# Patient Record
Sex: FEMALE | Race: WHITE | NOT HISPANIC OR LATINO | Employment: OTHER | URBAN - METROPOLITAN AREA
[De-identification: names, ages, dates, MRNs, and addresses within clinical notes are randomized per-mention and may not be internally consistent; named-entity substitution may affect disease eponyms.]

---

## 2021-01-25 ENCOUNTER — TRANSFERRED RECORDS (OUTPATIENT)
Dept: HEALTH INFORMATION MANAGEMENT | Facility: CLINIC | Age: 86
End: 2021-01-25

## 2021-01-29 ENCOUNTER — TRANSCRIBE ORDERS (OUTPATIENT)
Dept: OTHER | Age: 86
End: 2021-01-29

## 2021-01-29 DIAGNOSIS — H53.2 DIPLOPIA: Primary | ICD-10-CM

## 2021-02-03 ENCOUNTER — OFFICE VISIT (OUTPATIENT)
Dept: OPHTHALMOLOGY | Facility: CLINIC | Age: 86
End: 2021-02-03
Attending: OPHTHALMOLOGY
Payer: COMMERCIAL

## 2021-02-03 DIAGNOSIS — H53.10 SUBJECTIVE VISUAL DISTURBANCE: ICD-10-CM

## 2021-02-03 DIAGNOSIS — H53.2 DOUBLE VISION: ICD-10-CM

## 2021-02-03 DIAGNOSIS — H49.13: Primary | ICD-10-CM

## 2021-02-03 DIAGNOSIS — H53.2 DIPLOPIA: ICD-10-CM

## 2021-02-03 DIAGNOSIS — H50.05 ESOTROPIA, ALTERNATING: ICD-10-CM

## 2021-02-03 PROCEDURE — 99204 OFFICE O/P NEW MOD 45 MIN: CPT | Performed by: OPHTHALMOLOGY

## 2021-02-03 PROCEDURE — G0463 HOSPITAL OUTPT CLINIC VISIT: HCPCS | Performed by: TECHNICIAN/TECHNOLOGIST

## 2021-02-03 ASSESSMENT — VISUAL ACUITY
OD_CC+: -3
OS_CC: 20/20
OD_CC: 20/20
OS_CC+: -3
METHOD: SNELLEN - LINEAR
CORRECTION_TYPE: GLASSES

## 2021-02-03 ASSESSMENT — CUP TO DISC RATIO
OS_RATIO: 0.3
OD_RATIO: 0.3

## 2021-02-03 ASSESSMENT — EXTERNAL EXAM - RIGHT EYE: OD_EXAM: NORMAL

## 2021-02-03 ASSESSMENT — TONOMETRY
IOP_METHOD: ICARE
OD_IOP_MMHG: 08
OS_IOP_MMHG: 08

## 2021-02-03 ASSESSMENT — SLIT LAMP EXAM - LIDS
COMMENTS: NORMAL
COMMENTS: NORMAL

## 2021-02-03 ASSESSMENT — CONF VISUAL FIELD
METHOD: COUNTING FINGERS
OD_NORMAL: 1
OS_NORMAL: 1

## 2021-02-03 ASSESSMENT — REFRACTION_WEARINGRX
OD_VPRISM: 2 BD
OD_SPHERE: -2.50
OS_ADD: +2.50
OS_CYLINDER: +1.50
OD_CYLINDER: +1.00
OS_AXIS: 080
OD_ADD: +2.50
OS_VPRISM: 2 BU
OD_AXIS: 110
OS_SPHERE: -2.50

## 2021-02-03 ASSESSMENT — EXTERNAL EXAM - LEFT EYE: OS_EXAM: NORMAL

## 2021-02-03 NOTE — PROGRESS NOTES
1. 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 Interestingly the patient provided a history of double vision for the past 4 years however letter from an evaluation by pediatric ophthalmologist Dr. Johanna almonte at the [facility] in 2011 indicates that the patient at that time had 6 years of proceeding double vision which would indicate that her issues with double vision had actually been occurring for around 16 years now. 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 She had difficulty with fusion despite prisms at that time due to excyclotorsion. She was offered strabismus surgery but considering she had no double vision with reading at that time it seems that she deferred surgery. 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 She also has a V-pattern horizontal strabismus in that she is exotropic in upgaze but esotropia in downgaze. She likewise has an approximate 10-11 prism diopter esotropia in primary gaze which is well corrected with her current prism prescription.    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 Her current prism prescription is very close to optimal. I do not think that changing her prisms would render her additional  benefit. Her primary barrier to fusion is her amount of extorsion. Interestingly this was designated as her primary barrier to fusion back in 2011 as well however an underlying left cranial nerve IV palsy was not proposed at that time.    I see 2 primary options here. One is for her to continue with her current prism glasses and adopting a chin down position to minimize extorsion and putting up with her residual double vision. The second option would be to undergo strabismus surgery with bilateral inferior oblique myectomy as well as bilateral medial rectus recessions. It is likely that she will require prism glasses after surgery but she should require less prism and the prism should be more effective in resolving her double vision with dramatic improvement of the extorsion. I would expect that each inferior oblique myectomy should correct 5 to 8  of extorsion which would either completely eliminate her extorsion or dramatically reduce it to the point that she should be able to fuse. Of course at her age and given her cardiac health general anesthesia does carry some risk despite that risk being quite low. She will consider her options of strabismus surgery and let us know later if she wishes to proceed. I informed her that there is no rush to make a decision regarding surgery and the surgery could be performed at any point in the future without making the surgery more challenging. Given her cardiac history I would like clearance from her cardiologist prior to surgery if she chooses to proceed.    Of note giving the pattern here and the duration of strabismus over at least 16 years I do not feel that additional work-up with neuroimaging, for instance, is indicated.     Yi Aguilera is a pleasant 89 year old White female who presents to neuro-ophthalmology clinic today as referral from Dr. Gibbs from [facility].     The patient reports 4 years of diplopia. 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 They discussed surgery at that time for her right CN IV palsy but elected not to proceed.    More recently, she was was evaluated by her primary eye doctor in [place]. She has been requiring increasing prism in her glasses to account for her worsening diplopia. She still notices a torsional component.  Within the past 5 months it has worsened more rapidly -- diplopia is more frequent and images are farther apart. Her glasses contain 4.25 GARRY prism in each eye, and 2BD OD and 2 BU OS.     While wearing her glasses: when given an image of a white dot on a screen, when she covers her right eye, she sees a bright white dot with a small hemicircle directly above it and overlapping but slightly lighter, and a third full dot above these that is the lightest. This is the same image she sees with both eyes uncovered. When she covers her left eye, she sees only one dot.      Without glasses, she notices a torsional component in the images with each eye. She prefers to hold her head chin down which helps reduce the torsion and diplopia.     She was able to lose 75 lbs intentionally over the last year.     POH: CEIOL (left 5/15/2002, right 6/12/2002). Early AMD OU (not on AREDS, former smoker - quit 50 yrs ago)  PMH: aortic stenosis s/p TAVR, s/p pacemaker (complicated postsurgical course - pericardial effusion), CKD III,   FH: negative for diplopia  Meds: Eliquis, ASA 81, colchicine, pantoprazole, rosuvastatin, Vitamin D, Zinc    The patient has normal afferent visual function in both eyes including normal best corrected visual acuity, color vision, confrontation visual fields, and pupillary light responses in both eyes.    Sensorimotor testing today showed a relatively concomitant esotropia of approximately 11 prism diopters that measured less at near. In primary gaze there was a right hypertropia of 4 prism diopters that increased  in left gaze and decreased in right gaze. On motility testing there was a noteworthy presence of bilateral inferior oblique overaction more prominent on the right but also present on the left. There was mild downbeat nystagmus in far lateral gaze which I believe is a red herring in this case and not entirely unusual in an 89-year-old. Motility was otherwise full in both eyes.  Structural eye examination revealed PCIOL is in good locations in both eyes. 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    We discussed in detail the risks, benefits, and alternatives of eye muscle correction surgery including the very rare risk of death or serious morbidity from a general anesthesia complication and the rare risk of severe vision loss in the operative eye(s) secondary to retinal detachment or endophthalmitis.  We discussed more likely sub-optimal outcomes including the unanticipated need for additional strabismus surgery.  Finally the patient was aware that prisms glasses may be required to optimize single vision following surgery.    After a thorough discussion of these risks, the patient decided to consider strabismus surgery.  The surgical plan would be as follows:    1. Bilateral inferior oblique myectomy   2. Bilateral medial rectus recessions    Follow-up 1 week after strabismus surgery.    Plan to operate at: [facility] (due to pacemaker and heart history)  Prism free glasses for adjustment: Non adjustable.         55 minutes were spent on the date of the encounter by me doing chart review, history and exam, documentation, and further activities as noted above    Complete documentation of historical and exam elements from today's encounter can be found in the full encounter summary report (not reduplicated in this progress note).  I personally obtained the chief complaint(s) and history of present illness.  I confirmed and edited as necessary the review of systems, past medical/surgical  history, family history, social history, and examination findings as documented by others; and I examined the patient myself.  I personally reviewed the relevant tests, images, and reports as documented above.  I formulated and edited as necessary the assessment and plan and discussed the findings and management plan with the patient and family.  I personally reviewed the ophthalmic test(s) associated with this encounter, agree with the interpretation(s) as documented by the resident/fellow, and have edited the corresponding report(s) as necessary.     MD Sarmad Bedoya MD  Ophthalmology PGY-3  [facility]

## 2021-02-03 NOTE — LETTER
2021    RE: Yi Aguilera  : 1931  MRN: [id number]    Dear Dr. Gibbs    Thank you for referring your patient, Yi Aguilera, to my neuro-ophthalmology clinic recently.  After a thorough neuro-ophthalmic history and examination, I came to the following conclusions:       1. 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 Interestingly the patient provided a history of double vision for the past 4 years however letter from an evaluation by pediatric ophthalmologist Dr. Johanna almonte at the [facility] in  indicates that the patient at that time had 6 years of proceeding double vision which would indicate that her issues with double vision had actually been occurring for around 16 years now. 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 She had difficulty with fusion despite prisms at that time due to excyclotorsion. She was offered strabismus surgery but considering she had no double vision with reading at that time it seems that she deferred surgery. 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 She also has a V-pattern horizontal strabismus in that she is exotropic in upgaze but esotropia in downgaze. She likewise has an approximate 10-11 prism diopter esotropia in primary gaze which is well corrected with her current prism prescription.    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 Her current prism prescription is very close to optimal. I do not think that changing her prisms would render her additional benefit. Her primary barrier to fusion is her amount of extorsion. Interestingly this was designated as her primary barrier to fusion back in 2011 as well however an underlying left cranial nerve IV palsy was not proposed at that time.    I see 2 primary options here. One is for her to continue with her current prism glasses and adopting a chin down position to minimize extorsion and putting up with her residual double vision. The second option would be to undergo strabismus surgery with bilateral inferior oblique myectomy as well as bilateral medial rectus recessions. It is likely that she will require prism glasses after surgery but she should require less prism and the prism should be more effective in resolving her double vision with dramatic improvement of the extorsion. I would expect that each inferior oblique myectomy should correct 5 to 8  of extorsion which would either completely eliminate her extorsion or dramatically reduce it to the point that she should be able to fuse. Of course at her age and given her cardiac health general anesthesia does carry some risk despite that risk being quite low. She will consider her options of strabismus surgery and let us know later if she wishes to proceed. I informed her that there is no rush to make a decision regarding surgery and the surgery could be performed at any point in the future without making the surgery more challenging. Given her cardiac history I would like clearance from her cardiologist prior to surgery if she chooses to proceed.    Of note giving the pattern here and the duration of strabismus over at least 16 years I do not feel that additional work-up with  neuroimaging, for instance, is indicated.     Yi Aguilera is a pleasant 89 year old White female who presents to neuro-ophthalmology clinic today as referral from Dr. Gibbs from [facility].     The patient reports 4 years of diplopia. On chart review, she was evaluated by Dr. Spencer in 2011 for V-pattern ET and RIGHT HYPERTROPIA with mildly symptomatic excyclotorsion. They discussed surgery at that time for her right CN IV palsy but elected not to proceed.    More recently, she was was evaluated by her primary eye doctor in [place]. She has been requiring increasing prism in her glasses to account for her worsening diplopia. She still notices a torsional component.  Within the past 5 months it has worsened more rapidly -- diplopia is more frequent and images are farther apart. Her glasses contain 4.25 GARRY prism in each eye, and 2BD OD and 2 BU OS.     While wearing her glasses: when given an image of a white dot on a screen, when she covers her right eye, she sees a bright white dot with a small hemicircle directly above it and overlapping but slightly lighter, and a third full dot above these that is the lightest. This is the same image she sees with both eyes uncovered. When she covers her left eye, she sees only one dot.      Without glasses, she notices a torsional component in the images with each eye. She prefers to hold her head chin down which helps reduce the torsion and diplopia.     She was able to lose 75 lbs intentionally over the last year.     POH: CEIOL (left 5/15/2002, right 6/12/2002). Early AMD OU (not on AREDS, former smoker - quit 50 yrs ago)  PMH: aortic stenosis s/p TAVR, s/p pacemaker (complicated postsurgical course - pericardial effusion), CKD III,   FH: negative for diplopia  Meds: Eliquis, ASA 81, colchicine, pantoprazole, rosuvastatin, Vitamin D, Zinc    The patient has normal afferent visual function in both eyes including normal best corrected visual acuity, color  vision, confrontation visual fields, and pupillary light responses in both eyes.    Sensorimotor testing today showed a relatively concomitant esotropia of approximately 11 prism diopters that measured less at near. In primary gaze there was a right hypertropia of 4 prism diopters that increased in left gaze and decreased in right gaze. On motility testing there was a noteworthy presence of bilateral inferior oblique overaction more prominent on the right but also present on the left. There was mild downbeat nystagmus in far lateral gaze which I believe is a red herring in this case and not entirely unusual in an 89-year-old. Motility was otherwise full in both eyes.  Structural eye examination revealed PCIOL is in good locations in both eyes. 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    We discussed in detail the risks, benefits, and alternatives of eye muscle correction surgery including the very rare risk of death or serious morbidity from a general anesthesia complication and the rare risk of severe vision loss in the operative eye(s) secondary to retinal detachment or endophthalmitis.  We discussed more likely sub-optimal outcomes including the unanticipated need for additional strabismus surgery.  Finally the patient was aware that prisms glasses may be required to optimize single vision following surgery.    After a thorough discussion of these risks, the patient decided to consider strabismus surgery.  The surgical plan would be as follows:    1. Bilateral inferior oblique myectomy   2. Bilateral medial rectus recessions    Follow-up 1 week after strabismus surgery.    Plan to operate at: [facility] (due to pacemaker and heart history)  Prism free glasses for adjustment: Non adjustable.      Again, thank you for trusting me with the care of your patient.  For further exam details, please feel free to contact our office for additional records.  If you wish to contact me  regarding this patient please email me at [email or web address] or give my clinic a call to arrange a phone conversation.    Sincerely,    Segundo Heaton MD  , Neuro-Ophthalmology and Adult Strabismus Surgery  [facility] Chair in Neuro-Ophthalmology  Department of Ophthalmology and Visual Neurosciences  [facility]

## 2023-05-23 ENCOUNTER — TELEPHONE (OUTPATIENT)
Dept: PHYSICAL MEDICINE AND REHAB | Facility: CLINIC | Age: 88
End: 2023-05-23
Payer: COMMERCIAL

## 2023-05-23 NOTE — TELEPHONE ENCOUNTER
[facility] Call Center    Phone Message    May a detailed message be left on voicemail: yes     Reason for Call: Other: Patient is requesting a call back to discuss whether an ablation will be appropriate for her as she has a pacemaker.     Action Taken: Message routed to:  Other: [facility]    Travel Screening: Not Applicable

## 2023-05-25 NOTE — TELEPHONE ENCOUNTER
Call placed to pt. Pt currently driving and on her way to an appt. She asked to be called back between 11:15 and 12:15.

## 2023-05-25 NOTE — TELEPHONE ENCOUNTER
Call placed to pt to discuss. Answered all of patients questions in regards to shoulder RFA process. She will continue with appt as scheduled and will call if she has any further questions/concerns prior.

## 2023-06-01 ENCOUNTER — TELEPHONE (OUTPATIENT)
Dept: PHYSICAL MEDICINE AND REHAB | Facility: CLINIC | Age: 88
End: 2023-06-01

## 2023-06-01 NOTE — TELEPHONE ENCOUNTER
[facility] Call Center    Phone Message    May a detailed message be left on voicemail: yes     Reason for Call: Other: Patient would like to know if her first appointment be a virtual, with Dr Villatoro, please call her back to advise     Action Taken: Message routed to:  Other: [facility]    Travel Screening: Not Applicable

## 2023-06-02 NOTE — TELEPHONE ENCOUNTER
We cannot do virtual visits for first appointment. Please call patient to let her know this. If this date/time does not work for her to come in please assist her with rescheduling. Thanks!

## 2023-06-16 ENCOUNTER — OFFICE VISIT (OUTPATIENT)
Dept: PHYSICAL MEDICINE AND REHAB | Facility: CLINIC | Age: 88
End: 2023-06-16
Payer: COMMERCIAL

## 2023-06-16 VITALS — DIASTOLIC BLOOD PRESSURE: 65 MMHG | SYSTOLIC BLOOD PRESSURE: 135 MMHG | HEART RATE: 97 BPM

## 2023-06-16 DIAGNOSIS — M19.012 PRIMARY OSTEOARTHRITIS OF LEFT SHOULDER: Primary | ICD-10-CM

## 2023-06-16 DIAGNOSIS — M79.18 MYOFASCIAL PAIN: ICD-10-CM

## 2023-06-16 PROCEDURE — 99204 OFFICE O/P NEW MOD 45 MIN: CPT | Performed by: PHYSICAL MEDICINE & REHABILITATION

## 2023-06-16 RX ORDER — TERBINAFINE HYDROCHLORIDE 250 MG/1
TABLET ORAL
COMMUNITY
Start: 2023-03-20

## 2023-06-16 RX ORDER — MULTIVITAMIN WITH IRON
1 TABLET ORAL DAILY
COMMUNITY

## 2023-06-16 RX ORDER — VITAMIN B COMPLEX
TABLET ORAL DAILY
COMMUNITY

## 2023-06-16 RX ORDER — GABAPENTIN 100 MG/1
CAPSULE ORAL
COMMUNITY
Start: 2023-06-08

## 2023-06-16 RX ORDER — APIXABAN 5 MG/1
1 TABLET, FILM COATED ORAL
COMMUNITY
Start: 2023-04-06

## 2023-06-16 RX ORDER — UBIDECARENONE 75 MG
100 CAPSULE ORAL DAILY
COMMUNITY

## 2023-06-16 RX ORDER — PNV NO.95/FERROUS FUM/FOLIC AC 28MG-0.8MG
1 TABLET ORAL DAILY
COMMUNITY

## 2023-06-16 RX ORDER — ROSUVASTATIN CALCIUM 5 MG/1
TABLET, COATED ORAL
COMMUNITY
Start: 2023-05-03

## 2023-06-16 RX ORDER — ZINC GLUCONATE 50 MG
50 TABLET ORAL DAILY
COMMUNITY

## 2023-06-16 ASSESSMENT — PAIN SCALES - GENERAL: PAINLEVEL: MODERATE PAIN (4)

## 2023-06-16 NOTE — PROGRESS NOTES
Assessment/Plan:      Yi was seen today for shoulder pain.    Diagnoses and all orders for this visit:    Primary osteoarthritis of left shoulder    Myofascial pain         Assessment: Pleasant 89 year old female with a history of aortic valve replacement on Eliquis, hyperlipidemia, A-fib with:    1.  6-8-month history of severe left shoulder pain with decreased range of motion.  Has some parascapular myofascial pain as well.  Most significant pain is related to severe glenohumeral joint osteoarthritis of the left shoulder.  Has failed conservative management including physical therapy,Glenohumeral joint injection under ultrasound, Toradol injection, Tylenol, acupuncture.      Discussion:    1.  I discussed the diagnosis and treatment options with the patient and her daughter in law.  We discussed options of cool RF or at least a diagnostic block initially however she does have a pacemaker which would likely make her ineligible for RF.  She has been seen by a surgeon as well and is contemplating surgery versus RF.    2.  Recommend trial of Aspercreme with lidocaine, lidocaine patches and Salonpas for left shoulder.    3.  I would recommend muscle relaxer such as baclofen however the patient is not interested in medications at this time.    4.  Continue gabapentin 100 mg at bedtime for restless leg as this may be helpful for left shoulder pain.    5.  Follow-up with me as needed    It was our pleasure caring for your patient today, if there any questions or concerns please do not hesitate to contact us.      Subjective:   Patient ID: Yi Aguilera is a 89 year old female.    History of Present Illness: Patient presents at the request for evaluation of left shoulder pain.  She has pain through her posterior and anterior left shoulder up radiating through the upper trapezius region.  She has no pain radiating into the arm.  This has been present for the past 6 to 8 months.  No injury.  Has become constant.  Worse  with any lifting or raising her arm.  She can no longer do any activity with her left arm.  Better with letting her arm hang to the side.  Has had pain rating a 10/10 today and at worst.  She has no radiation of the pain down into the arm paresthesias or weakness.  She has had extensive treatment with Dr. Johnson at sports medicine in [facility] where she reports having 2 steroid injections in the shoulder and also was referred to [facility] orthopedics and I reviewed the note.  They performed a left shoulder glenohumeral joint injection under ultrasound guidance.  The patient reports no improvement initially with the injection with local anesthetic or with steroid.  She also has had a Toradol injection without benefit.  Has been to physical therapy in [place] for several visits without benefit.  No chiropractic.  She has questions regarding radiofrequency ablation for the left shoulder.  She has been seen by a surgeon today who said she was a good candidate for shoulder replacement as well.      Imaging: Plain films of the left shoulder show severe glenohumeral joint osteoarthritis and large subacromial spur.  I personally reviewed the imaging and the report.       Review of Systems: Complains of some swelling in the legs/feet, joint pain, muscle pain, muscle fatigue, skin itching and poor sleep occasionally.  Denies fever, unintentional weight loss, waking, headache, change in vision, chest pain, shortness of breath, abdominal pain, nausea vomiting, bowel or bladder incontinence, balance issues.  Remainder of 12 point review systems negative unless listed above.    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  No tobacco or alcohol.    The following portions of the patient's history were reviewed and updated as appropriate: allergies, current medications, past family history, past medical history, past social history, past surgical history and problem list.    Oswestry (OLEKSANDR) Questionnaire         View : No data to display.                Neck Disability Index:       View : No data to display.                   PHQ-2 Score:         6/16/2023     2:17 PM 2/3/2021     2:13 PM   PHQ-2 ( 1999 [employer])   Q1: Little interest or pleasure in doing things 0 0   Q2: Feeling down, depressed or hopeless 0 0   PHQ-2 Score 0 0   PHQ-2 Total Score (12-17 Years)- Positive if 3 or more points; Administer PHQ-A if positive  0                  Objective:   Physical Exam:    /65   Pulse 97   There is no height or weight on file to calculate BMI.     General:  Well-appearing female in no acute distress.  Pleasant, cooperative, and interactive throughout the examination and interview.  CV: 1+ bilateral lower extremity edema.  Lymphatics: No cervical lymphadenopathy palpated.  Eyes: sclera clear.  Skin: No rashes or lesions seen over the head/neck, hairline, arms, legs, trunk.  Respirations unlabored.  MSK: Gait is cautious using single-point cane..     Negative Romberg.  Spine: normal AP curves of the C, T, and L spine.  Full range of motion in the cervical spine in all planes.  Palpation: No tenderness palpation through the cervical paraspinals or parascapular region.  Tenderness over the anterior and lateral shoulder.  Extremities: Severely limited left shoulder range of motion is not able to raise the left shoulder in abduction or flexion without severe pain.  Full range of motion of the right  shoulder in abduction and internal and external rotation.  Severely limited passive range of motion of the left shoulder in abduction and internal and external rotation.  Full range of motion of the  elbows, and wrists with no effusions or tenderness to palpation.   Full range of motion of the  knees, and ankles from a seated position with no effusions or tenderness to palpation. No hypermobility of the upper or lower extremities.  Neurologic exam: Mental status: Patient is alert and oriented with normal affect.  Attention, knowledge, memory, and language are intact.  Normal coordination throughout the examination.  Reflexes are 2+ and symmetric biceps, triceps, brachioradialis, patellar, and Achilles with Negative Desiree's.  Sensation is intact to light touch throughout the upper and lower extremities bilaterally.  Manual muscle testing reveals 5 out of 5 strength in the right  shoulder abductors, bilateral elbow flexors/extensors, wrist extensors, interosseous, and finger flexors; lower extremity strength appears grossly normal.   Normal muscle bulk and tone in the arms and legs.  Negative Spurling's test bilaterally.

## 2023-06-16 NOTE — LETTER
6/16/2023         RE: Yi Aguilera  [address]   [address]  [place] [id number]        Dear Colleague,    Thank you for referring your patient, Yi Aguilera, to the [facility]. Please see a copy of my visit note below.    Assessment/Plan:      Yi was seen today for shoulder pain.    Diagnoses and all orders for this visit:    Primary osteoarthritis of left shoulder    Myofascial pain         Assessment: Pleasant 89 year old female with a history of aortic valve replacement on Eliquis, hyperlipidemia, A-fib with:    1.  6-8-month history of severe left shoulder pain with decreased range of motion.  Has some parascapular myofascial pain as well.  Most significant pain is related to severe glenohumeral joint osteoarthritis of the left shoulder.  Has failed conservative management including physical therapy,Glenohumeral joint injection under ultrasound, Toradol injection, Tylenol, acupuncture.      Discussion:    1.  I discussed the diagnosis and treatment options with the patient and her daughter in law.  We discussed options of cool RF or at least a diagnostic block initially however she does have a pacemaker which would likely make her ineligible for RF.  She has been seen by a surgeon as well and is contemplating surgery versus RF.    2.  Recommend trial of Aspercreme with lidocaine, lidocaine patches and Salonpas for left shoulder.    3.  I would recommend muscle relaxer such as baclofen however the patient is not interested in medications at this time.    4.  Continue gabapentin 100 mg at bedtime for restless leg as this may be helpful for left shoulder pain.    5.  Follow-up with me as needed    It was our pleasure caring for your patient today, if there any questions or concerns please do not hesitate to contact us.      Subjective:   Patient ID: Yi Aguilera is a 89 year old female.    History of Present Illness: Patient presents at the request for  evaluation of left shoulder pain.  She has pain through her posterior and anterior left shoulder up radiating through the upper trapezius region.  She has no pain radiating into the arm.  This has been present for the past 6 to 8 months.  No injury.  Has become constant.  Worse with any lifting or raising her arm.  She can no longer do any activity with her left arm.  Better with letting her arm hang to the side.  Has had pain rating a 10/10 today and at worst.  She has no radiation of the pain down into the arm paresthesias or weakness.  She has had extensive treatment with Dr. Johnson at sports medicine in [facility] where she reports having 2 steroid injections in the shoulder and also was referred to [facility] orthopedics and I reviewed the note.  They performed a left shoulder glenohumeral joint injection under ultrasound guidance.  The patient reports no improvement initially with the injection with local anesthetic or with steroid.  She also has had a Toradol injection without benefit.  Has been to physical therapy in [place] for several visits without benefit.  No chiropractic.  She has questions regarding radiofrequency ablation for the left shoulder.  She has been seen by a surgeon today who said she was a good candidate for shoulder replacement as well.      Imaging: Plain films of the left shoulder show severe glenohumeral joint osteoarthritis and large subacromial spur.  I personally reviewed the imaging and the report.       Review of Systems: Complains of some swelling in the legs/feet, joint pain, muscle pain, muscle fatigue, skin itching and poor sleep occasionally.  Denies fever, unintentional weight loss, waking, headache, change in vision, chest pain, shortness of breath, abdominal pain, nausea vomiting, bowel or bladder incontinence, balance issues.  Remainder of 12 point review systems negative unless listed above.    Past Medical History:   Diagnosis Date     A-fib (H)       Hyperlipidemia        Family History   Problem Relation Age of Onset     EYE* Mother         catatacts     EYE* Father         cataracts         Social History     Socioeconomic History     Marital status:      Spouse name: None     Number of children: None     Years of education: None     Highest education level: None   Tobacco Use     Smoking status: Never     Smokeless tobacco: Never   Substance and Sexual Activity     Alcohol use: No     Social history: Retired from the [employer] department at [employer].  No tobacco or alcohol.    The following portions of the patient's history were reviewed and updated as appropriate: allergies, current medications, past family history, past medical history, past social history, past surgical history and problem list.    Oswestry (OLEKSANDR) Questionnaire         View : No data to display.                Neck Disability Index:       View : No data to display.                   PHQ-2 Score:         6/16/2023     2:17 PM 2/3/2021     2:13 PM   PHQ-2 ( 1999 [employer])   Q1: Little interest or pleasure in doing things 0 0   Q2: Feeling down, depressed or hopeless 0 0   PHQ-2 Score 0 0   PHQ-2 Total Score (12-17 Years)- Positive if 3 or more points; Administer PHQ-A if positive  0                  Objective:   Physical Exam:    /65   Pulse 97   There is no height or weight on file to calculate BMI.     General:  Well-appearing female in no acute distress.  Pleasant, cooperative, and interactive throughout the examination and interview.  CV: 1+ bilateral lower extremity edema.  Lymphatics: No cervical lymphadenopathy palpated.  Eyes: sclera clear.  Skin: No rashes or lesions seen over the head/neck, hairline, arms, legs, trunk.  Respirations unlabored.  MSK: Gait is cautious using single-point cane..     Negative Romberg.  Spine: normal AP curves of the C, T, and L spine.  Full range of motion in the cervical spine in all planes.  Palpation: No tenderness palpation through the  cervical paraspinals or parascapular region.  Tenderness over the anterior and lateral shoulder.  Extremities: Severely limited left shoulder range of motion is not able to raise the left shoulder in abduction or flexion without severe pain.  Full range of motion of the right shoulder in abduction and internal and external rotation.  Severely limited passive range of motion of the left shoulder in abduction and internal and external rotation.  Full range of motion of the  elbows, and wrists with no effusions or tenderness to palpation.   Full range of motion of the  knees, and ankles from a seated position with no effusions or tenderness to palpation. No hypermobility of the upper or lower extremities.  Neurologic exam: Mental status: Patient is alert and oriented with normal affect.  Attention, knowledge, memory, and language are intact.  Normal coordination throughout the examination.  Reflexes are 2+ and symmetric biceps, triceps, brachioradialis, patellar, and Achilles with Negative Desiree's.  Sensation is intact to light touch throughout the upper and lower extremities bilaterally.  Manual muscle testing reveals 5 out of 5 strength in the right  shoulder abductors, bilateral elbow flexors/extensors, wrist extensors, interosseous, and finger flexors; lower extremity strength appears grossly normal.   Normal muscle bulk and tone in the arms and legs.  Negative Spurling's test bilaterally.           Again, thank you for allowing me to participate in the care of your patient.        Sincerely,        Huber Villatoro, DO

## 2023-06-16 NOTE — PATIENT INSTRUCTIONS
Try lidocaine patch over the counter or salon pas patches  We can consider a shoulder block for diagnotic purposes

## 2023-06-19 ENCOUNTER — TELEPHONE (OUTPATIENT)
Dept: PHYSICAL MEDICINE AND REHAB | Facility: CLINIC | Age: 88
End: 2023-06-19

## 2023-06-19 NOTE — TELEPHONE ENCOUNTER
----- Message from Huber Villatoro DO sent at 6/19/2023  7:52 AM CDT -----  Regarding: FW: Cool RF  Can you please contact the patient and let her know that I discussed her case with Dr. Blanco.  He agreed that due to the proximity to the pacemaker a left shoulder cool leaf RF would Not be a good route for her.    I encouraged her to follow-up with orthopedics again and I wish her the best.  She can follow-up with me if she needs.      ----- Message -----  From: Quinn Blanco DO  Sent: 6/19/2023   7:29 AM CDT  To: Huber Villatoro DO  Subject: RE: Cool RF                                      Correct.  Thanks Huber!  ----- Message -----  From: Huber Villatoro DO  Sent: 6/16/2023   4:29 PM CDT  To: Quinn Blanco DO  Subject: Cool RF                                          Bill,    This patient came in for consult regarding cool RF.  She has severe left glenohumeral joint osteoarthritis.  She is trying to decide between shoulder replacement and cool RF.  She has a pacemaker.  She is unable to have RF if she has a pacemaker correct.    -CIRO

## 2023-06-19 NOTE — TELEPHONE ENCOUNTER
Spoke to pt regarding Dr. Villatoro' recommendation. She verbalized understanding and is planning to get a second opinion at [facility].